# Patient Record
Sex: MALE | Race: WHITE | Employment: FULL TIME | URBAN - METROPOLITAN AREA
[De-identification: names, ages, dates, MRNs, and addresses within clinical notes are randomized per-mention and may not be internally consistent; named-entity substitution may affect disease eponyms.]

---

## 2019-03-26 ENCOUNTER — APPOINTMENT (OUTPATIENT)
Dept: GENERAL RADIOLOGY | Age: 29
End: 2019-03-26
Attending: NURSE PRACTITIONER
Payer: SUBSIDIZED

## 2019-03-26 ENCOUNTER — HOSPITAL ENCOUNTER (EMERGENCY)
Age: 29
Discharge: HOME | End: 2019-03-26
Attending: EMERGENCY MEDICINE
Payer: SUBSIDIZED

## 2019-03-26 ENCOUNTER — APPOINTMENT (OUTPATIENT)
Dept: CT IMAGING | Age: 29
End: 2019-03-26
Attending: NURSE PRACTITIONER
Payer: SUBSIDIZED

## 2019-03-26 VITALS
HEART RATE: 76 BPM | DIASTOLIC BLOOD PRESSURE: 99 MMHG | OXYGEN SATURATION: 97 % | TEMPERATURE: 97.5 F | SYSTOLIC BLOOD PRESSURE: 148 MMHG | RESPIRATION RATE: 15 BRPM

## 2019-03-26 DIAGNOSIS — G47.33 OSA (OBSTRUCTIVE SLEEP APNEA): ICD-10-CM

## 2019-03-26 DIAGNOSIS — I10 ESSENTIAL HYPERTENSION: Primary | ICD-10-CM

## 2019-03-26 LAB
ALBUMIN SERPL-MCNC: 4.3 G/DL (ref 3.5–5)
ALBUMIN/GLOB SERPL: 1.3 {RATIO} (ref 1.1–2.2)
ALP SERPL-CCNC: 92 U/L (ref 45–117)
ALT SERPL-CCNC: 54 U/L (ref 12–78)
ANION GAP SERPL CALC-SCNC: 5 MMOL/L (ref 5–15)
APPEARANCE UR: CLEAR
AST SERPL-CCNC: 24 U/L (ref 15–37)
ATRIAL RATE: 80 BPM
BACTERIA URNS QL MICRO: NEGATIVE /HPF
BASOPHILS # BLD: 0.1 K/UL (ref 0–0.1)
BASOPHILS NFR BLD: 1 % (ref 0–1)
BILIRUB SERPL-MCNC: 0.4 MG/DL (ref 0.2–1)
BILIRUB UR QL: NEGATIVE
BUN SERPL-MCNC: 16 MG/DL (ref 6–20)
BUN/CREAT SERPL: 21 (ref 12–20)
CALCIUM SERPL-MCNC: 9 MG/DL (ref 8.5–10.1)
CALCULATED P AXIS, ECG09: 13 DEGREES
CALCULATED R AXIS, ECG10: -11 DEGREES
CALCULATED T AXIS, ECG11: 12 DEGREES
CHLORIDE SERPL-SCNC: 106 MMOL/L (ref 97–108)
CO2 SERPL-SCNC: 27 MMOL/L (ref 21–32)
COLOR UR: NORMAL
COMMENT, HOLDF: NORMAL
CREAT SERPL-MCNC: 0.75 MG/DL (ref 0.7–1.3)
D DIMER PPP FEU-MCNC: <0.19 MG/L FEU (ref 0–0.65)
DIAGNOSIS, 93000: NORMAL
DIFFERENTIAL METHOD BLD: NORMAL
EOSINOPHIL # BLD: 0.3 K/UL (ref 0–0.4)
EOSINOPHIL NFR BLD: 6 % (ref 0–7)
EPITH CASTS URNS QL MICRO: NORMAL /LPF
ERYTHROCYTE [DISTWIDTH] IN BLOOD BY AUTOMATED COUNT: 12.3 % (ref 11.5–14.5)
GLOBULIN SER CALC-MCNC: 3.3 G/DL (ref 2–4)
GLUCOSE SERPL-MCNC: 91 MG/DL (ref 65–100)
GLUCOSE UR STRIP.AUTO-MCNC: NEGATIVE MG/DL
HCT VFR BLD AUTO: 47.8 % (ref 36.6–50.3)
HGB BLD-MCNC: 16.5 G/DL (ref 12.1–17)
HGB UR QL STRIP: NEGATIVE
HYALINE CASTS URNS QL MICRO: NORMAL /LPF (ref 0–5)
IMM GRANULOCYTES # BLD AUTO: 0 K/UL (ref 0–0.04)
IMM GRANULOCYTES NFR BLD AUTO: 0 % (ref 0–0.5)
KETONES UR QL STRIP.AUTO: NEGATIVE MG/DL
LEUKOCYTE ESTERASE UR QL STRIP.AUTO: NEGATIVE
LYMPHOCYTES # BLD: 1.6 K/UL (ref 0.8–3.5)
LYMPHOCYTES NFR BLD: 31 % (ref 12–49)
MCH RBC QN AUTO: 30.3 PG (ref 26–34)
MCHC RBC AUTO-ENTMCNC: 34.5 G/DL (ref 30–36.5)
MCV RBC AUTO: 87.7 FL (ref 80–99)
MONOCYTES # BLD: 0.5 K/UL (ref 0–1)
MONOCYTES NFR BLD: 9 % (ref 5–13)
NEUTS SEG # BLD: 2.8 K/UL (ref 1.8–8)
NEUTS SEG NFR BLD: 53 % (ref 32–75)
NITRITE UR QL STRIP.AUTO: NEGATIVE
NRBC # BLD: 0 K/UL (ref 0–0.01)
NRBC BLD-RTO: 0 PER 100 WBC
P-R INTERVAL, ECG05: 164 MS
PH UR STRIP: 7 [PH] (ref 5–8)
PLATELET # BLD AUTO: 291 K/UL (ref 150–400)
PMV BLD AUTO: 9.5 FL (ref 8.9–12.9)
POTASSIUM SERPL-SCNC: 3.7 MMOL/L (ref 3.5–5.1)
PROT SERPL-MCNC: 7.6 G/DL (ref 6.4–8.2)
PROT UR STRIP-MCNC: NEGATIVE MG/DL
Q-T INTERVAL, ECG07: 358 MS
QRS DURATION, ECG06: 96 MS
QTC CALCULATION (BEZET), ECG08: 412 MS
RBC # BLD AUTO: 5.45 M/UL (ref 4.1–5.7)
RBC #/AREA URNS HPF: NORMAL /HPF (ref 0–5)
SAMPLES BEING HELD,HOLD: NORMAL
SODIUM SERPL-SCNC: 138 MMOL/L (ref 136–145)
SP GR UR REFRACTOMETRY: 1.02 (ref 1–1.03)
TROPONIN I SERPL-MCNC: <0.05 NG/ML
UROBILINOGEN UR QL STRIP.AUTO: 0.2 EU/DL (ref 0.2–1)
VENTRICULAR RATE, ECG03: 80 BPM
WBC # BLD AUTO: 5.3 K/UL (ref 4.1–11.1)
WBC URNS QL MICRO: NORMAL /HPF (ref 0–4)

## 2019-03-26 PROCEDURE — 36415 COLL VENOUS BLD VENIPUNCTURE: CPT

## 2019-03-26 PROCEDURE — 71046 X-RAY EXAM CHEST 2 VIEWS: CPT

## 2019-03-26 PROCEDURE — 93005 ELECTROCARDIOGRAM TRACING: CPT

## 2019-03-26 PROCEDURE — 84484 ASSAY OF TROPONIN QUANT: CPT

## 2019-03-26 PROCEDURE — 85025 COMPLETE CBC W/AUTO DIFF WBC: CPT

## 2019-03-26 PROCEDURE — 99283 EMERGENCY DEPT VISIT LOW MDM: CPT

## 2019-03-26 PROCEDURE — 70450 CT HEAD/BRAIN W/O DYE: CPT

## 2019-03-26 PROCEDURE — 81001 URINALYSIS AUTO W/SCOPE: CPT

## 2019-03-26 PROCEDURE — 80053 COMPREHEN METABOLIC PANEL: CPT

## 2019-03-26 PROCEDURE — 85379 FIBRIN DEGRADATION QUANT: CPT

## 2019-03-26 RX ORDER — AMLODIPINE BESYLATE 5 MG/1
5 TABLET ORAL DAILY
Qty: 30 TAB | Refills: 0 | Status: SHIPPED | OUTPATIENT
Start: 2019-03-26

## 2019-03-26 NOTE — LETTER
NOTIFICATION RETURN TO WORK / SCHOOL 
 
3/26/2019 12:35 PM 
 
Mr. Lauren To [address] [address] To Whom It May Concern: 
 
Lauren To is currently under the care of [facility]. He will return to work/school on: March 27, 2019 If there are questions or concerns please have the patient contact our office.  
 
 
 
Sincerely, 
 
 
 
 
Luke Aguilar NP 
12:36 PM

## 2019-03-26 NOTE — ED NOTES
Patient's oxygen saturation while sitting are 99%. While walking around the nurses stations the patient's saturations dropped to 96%. Patient reports \"a little bit of SOB\"

## 2019-03-26 NOTE — ED PROVIDER NOTES
The pt is a 33year old male who presents ambulatory from work to the ED with complaints SOB that began around 0200 this am. Associated symptoms include lightheadedness jaw tightness, and chest tightness that has been progressive for the last two weeks. He reports stepping on a nail 6 weeks ago and does not know when last tetanus booster was. Chest tightness is generalized and non exertional. No ETOH or drug use. 1/2 PPD smoker. Pt denies fevers, chills, night sweats, PALMER, PND, orthopnea, abdominal pain, n/v/d, melena, hematuria, dysuria, constipation, HA, and syncope. + CHANDRIKA but not does wear. Foot wound is well healed without pain, redness, or swelling. Past Medical History: 
No date: Brain aneurysm No date: Polycystic kidney disease Past Surgical History: 
No date: HX TONSILLECTOMY No date: NEUROLOGICAL PROCEDURE UNLISTED Comment:  brain coiling x 2 Past Medical History:  
Diagnosis Date  Brain aneurysm  Polycystic kidney disease Past Surgical History:  
Procedure Laterality Date  HX TONSILLECTOMY  NEUROLOGICAL PROCEDURE UNLISTED    
 brain coiling x 2 No family history on file. Social History Socioeconomic History  Marital status: SINGLE Spouse name: Not on file  Number of children: Not on file  Years of education: Not on file  Highest education level: Not on file Occupational History  Not on file Social Needs  Financial resource strain: Not on file  Food insecurity:  
  Worry: Not on file Inability: Not on file  Transportation needs:  
  Medical: Not on file Non-medical: Not on file Tobacco Use  Smoking status: Current Every Day Smoker Packs/day: 0.50 Substance and Sexual Activity  Alcohol use: No  
  Comment: Occ  Drug use: Yes Types: Marijuana Comment: occasionally  Sexual activity: Not on file Lifestyle  Physical activity:  
  Days per week: Not on file Minutes per session: Not on file  Stress: Not on file Relationships  Social connections:  
  Talks on phone: Not on file Gets together: Not on file Attends Gnosticism service: Not on file Active member of club or organization: Not on file Attends meetings of clubs or organizations: Not on file Relationship status: Not on file  Intimate partner violence:  
  Fear of current or ex partner: Not on file Emotionally abused: Not on file Physically abused: Not on file Forced sexual activity: Not on file Other Topics Concern  Not on file Social History Narrative  Not on file ALLERGIES: Patient has no known allergies. Review of Systems Constitutional: Negative for activity change, appetite change, chills, diaphoresis, fatigue, fever and unexpected weight change. HENT: Negative for congestion, ear pain, rhinorrhea, sinus pressure, sore throat, tinnitus, trouble swallowing and voice change. Eyes: Negative for pain, discharge, redness and visual disturbance. Respiratory: Positive for chest tightness and shortness of breath. Negative for apnea, cough, choking, wheezing and stridor. Cardiovascular: Positive for chest pain. Negative for palpitations and leg swelling. Gastrointestinal: Negative for abdominal pain, constipation, nausea and vomiting. Endocrine: Negative for cold intolerance and heat intolerance. Genitourinary: Negative for difficulty urinating, dysuria, flank pain, hematuria, testicular pain and urgency. Musculoskeletal: Negative for arthralgias, back pain, gait problem, joint swelling, myalgias, neck pain and neck stiffness. Skin: Negative for color change, pallor, rash and wound. Allergic/Immunologic: Negative for immunocompromised state. Neurological: Negative for dizziness, tremors, syncope, weakness, light-headedness, numbness and headaches. Hematological: Does not bruise/bleed easily. Psychiatric/Behavioral: Negative for agitation, confusion and suicidal ideas. Vitals:  
 03/26/19 6355 Pulse: 88 SpO2: 97% Physical Exam  
Constitutional: He is oriented to person, place, and time. He appears well-developed and well-nourished. No distress. HENT:  
Head: Atraumatic. Nose: Nose normal.  
Mouth/Throat: No oropharyngeal exudate. Eyes: Conjunctivae and EOM are normal. Right eye exhibits no discharge. Left eye exhibits no discharge. No scleral icterus. Neck: Normal range of motion. Neck supple. No JVD present. No tracheal deviation present. No thyromegaly present. Cardiovascular: Normal rate and regular rhythm. Exam reveals no gallop and no friction rub. No murmur heard. Pulmonary/Chest: Breath sounds normal. No stridor. No respiratory distress. He has no wheezes. He has no rales. He exhibits no tenderness. Abdominal: Soft. Bowel sounds are normal. He exhibits no distension and no mass. There is no tenderness. There is no rebound and no guarding. Musculoskeletal: Normal range of motion. He exhibits no edema or tenderness. Lymphadenopathy:  
  He has no cervical adenopathy. Neurological: He is alert and oriented to person, place, and time. Coordination normal.  
Skin: Skin is warm and dry. He is not diaphoretic. Psychiatric: He has a normal mood and affect. His behavior is normal.  
Nursing note and vitals reviewed. MDM Number of Diagnoses or Management Options Diagnosis management comments:  
 * routine laboratory data and UA * CXR * EKG Amount and/or Complexity of Data Reviewed Clinical lab tests: ordered and reviewed Tests in the radiology section of CPT®: ordered and reviewed Discussion of test results with the performing providers: yes Review and summarize past medical records: yes Discuss the patient with other providers: yes Risk of Complications, Morbidity, and/or Mortality General comments:  
 - stable, ambulatory pt in NAD 
 Patient Progress Patient progress: stable Procedures 12:41 PM 
Pt has been reevaluated. There are no new complaints, changes, or physical findings at this time. Medications have been reviewed w/ pt and/or family. Pt and/or family's questions have been answered. Pt and/or family expressed good understanding of the dx/tx/rx and is in agreement with plan of care. Pt instructed and agreed to f/u w/ PCP and to return to ED upon further deterioration. Pt is ready for discharge. LABORATORY TESTS: 
Recent Results (from the past 12 hour(s)) EKG, 12 LEAD, INITIAL Collection Time: 03/26/19 10:04 AM  
Result Value Ref Range Ventricular Rate 80 BPM  
 Atrial Rate 80 BPM  
 P-R Interval 164 ms QRS Duration 96 ms  
 Q-T Interval 358 ms QTC Calculation (Bezet) 412 ms Calculated P Axis 13 degrees Calculated R Axis -11 degrees Calculated T Axis 12 degrees Diagnosis Normal sinus rhythm Nonspecific T wave abnormality When compared with ECG of 14-OCT-2012 03:19, 
Previous ECG has undetermined rhythm, needs review Confirmed by Aggie Rodriguez M.D., Kristen Sinclair ([id number]) on 3/26/2019 66:20:21 AM 
  
METABOLIC PANEL, COMPREHENSIVE Collection Time: 03/26/19 10:09 AM  
Result Value Ref Range Sodium 138 136 - 145 mmol/L Potassium 3.7 3.5 - 5.1 mmol/L Chloride 106 97 - 108 mmol/L  
 CO2 27 21 - 32 mmol/L Anion gap 5 5 - 15 mmol/L Glucose 91 65 - 100 mg/dL BUN 16 6 - 20 MG/DL Creatinine 0.75 0.70 - 1.30 MG/DL  
 BUN/Creatinine ratio 21 (H) 12 - 20 GFR est AA >60 >60 ml/min/1.73m2 GFR est non-AA >60 >60 ml/min/1.73m2 Calcium 9.0 8.5 - 10.1 MG/DL Bilirubin, total 0.4 0.2 - 1.0 MG/DL  
 ALT (SGPT) 54 12 - 78 U/L  
 AST (SGOT) 24 15 - 37 U/L Alk. phosphatase 92 45 - 117 U/L Protein, total 7.6 6.4 - 8.2 g/dL Albumin 4.3 3.5 - 5.0 g/dL Globulin 3.3 2.0 - 4.0 g/dL A-G Ratio 1.3 1.1 - 2.2    
CBC WITH AUTOMATED DIFF  Collection Time: 03/26/19 10:09 AM  
 Result Value Ref Range WBC 5.3 4.1 - 11.1 K/uL  
 RBC 5.45 4.10 - 5.70 M/uL  
 HGB 16.5 12.1 - 17.0 g/dL HCT 47.8 36.6 - 50.3 % MCV 87.7 80.0 - 99.0 FL  
 MCH 30.3 26.0 - 34.0 PG  
 MCHC 34.5 30.0 - 36.5 g/dL  
 RDW 12.3 11.5 - 14.5 % PLATELET 916 463 - 237 K/uL MPV 9.5 8.9 - 12.9 FL  
 NRBC 0.0 0  WBC ABSOLUTE NRBC 0.00 0.00 - 0.01 K/uL NEUTROPHILS 53 32 - 75 % LYMPHOCYTES 31 12 - 49 % MONOCYTES 9 5 - 13 % EOSINOPHILS 6 0 - 7 % BASOPHILS 1 0 - 1 % IMMATURE GRANULOCYTES 0 0.0 - 0.5 % ABS. NEUTROPHILS 2.8 1.8 - 8.0 K/UL  
 ABS. LYMPHOCYTES 1.6 0.8 - 3.5 K/UL  
 ABS. MONOCYTES 0.5 0.0 - 1.0 K/UL  
 ABS. EOSINOPHILS 0.3 0.0 - 0.4 K/UL  
 ABS. BASOPHILS 0.1 0.0 - 0.1 K/UL  
 ABS. IMM. GRANS. 0.0 0.00 - 0.04 K/UL  
 DF AUTOMATED    
TROPONIN I Collection Time: 03/26/19 10:09 AM  
Result Value Ref Range Troponin-I, Qt. <0.05 <0.05 ng/mL SAMPLES BEING HELD Collection Time: 03/26/19 10:09 AM  
Result Value Ref Range SAMPLES BEING HELD 1RED,1BLUE   
 COMMENT Add-on orders for these samples will be processed based on acceptable specimen integrity and analyte stability, which may vary by analyte. URINALYSIS W/MICROSCOPIC Collection Time: 03/26/19 10:33 AM  
Result Value Ref Range Color YELLOW/STRAW Appearance CLEAR CLEAR Specific gravity 1.016 1.003 - 1.030    
 pH (UA) 7.0 5.0 - 8.0 Protein NEGATIVE  NEG mg/dL Glucose NEGATIVE  NEG mg/dL Ketone NEGATIVE  NEG mg/dL Bilirubin NEGATIVE  NEG Blood NEGATIVE  NEG Urobilinogen 0.2 0.2 - 1.0 EU/dL Nitrites NEGATIVE  NEG Leukocyte Esterase NEGATIVE  NEG    
 WBC 0-4 0 - 4 /hpf  
 RBC 0-5 0 - 5 /hpf Epithelial cells FEW FEW /lpf Bacteria NEGATIVE  NEG /hpf Hyaline cast 0-2 0 - 5 /lpf D DIMER Collection Time: 03/26/19 11:09 AM  
Result Value Ref Range D-dimer <0.19 0.00 - 0.65 mg/L FEU IMAGING RESULTS: 
CT HEAD WO CONT Final Result IMPRESSION: Status post right MCA aneurysm coiling. No acute process or change  
compared to the prior exam.  
  
  
  
XR CHEST PA LAT Final Result Impression: No acute process or change compared to the prior exam.  
   
  
 
Xr Chest Pa Lat Result Date: 3/26/2019 Exam:  2 view chest Indication: Shortness of breath Comparison to 10/14/2012. PA and lateral views demonstrate normal heart size. There is no acute process in the lung fields. The osseous structures are unremarkable. Impression: No acute process or change compared to the prior exam.   
 
Ct Head Wo Cont Result Date: 3/26/2019 EXAM: CT HEAD WO CONT INDICATION: Hypertension with history of aneurysm COMPARISON: 4/19/2013. CONTRAST: None. TECHNIQUE: Unenhanced CT of the head was performed using 5 mm images. Brain and bone windows were generated. CT dose reduction was achieved through use of a standardized protocol tailored for this examination and automatic exposure control for dose modulation. FINDINGS: The ventricles and sulci are normal in size, shape and configuration and midline. The patient is status post right MCA aneurysm coiling. There is no significant white matter disease. There is no intracranial hemorrhage, extra-axial collection, mass, mass effect or midline shift. The basilar cisterns are open. No acute infarct is identified. The bone windows demonstrate no abnormalities. The visualized portions of the paranasal sinuses and mastoid air cells are clear. IMPRESSION: Status post right MCA aneurysm coiling. No acute process or change compared to the prior exam.  
 
 
 
MEDICATIONS GIVEN: 
Medications - No data to display IMPRESSION: 
1. Essential hypertension 2. CHANDRIKA (obstructive sleep apnea) PLAN: 
1. Current Discharge Medication List  
  
START taking these medications Details  
amLODIPine (NORVASC) 5 mg tablet Take 1 Tab by mouth daily. Qty: 30 Tab, Refills: 0 Associated Diagnoses: Essential hypertension 2. Follow-up Information Follow up With Specialties Details Why Contact Info [address] In 2 days  [address] [id number] [phone] [facility] Emergency Medicine  As needed, If symptoms worsen [address] 
[phone]  
  
 
3. Supportive care - pt refused tetanus booster Return to ED if worse Wilfredo Heard NP 
12:41 PM

## 2019-03-26 NOTE — ED TRIAGE NOTES
Patient arrives for SOB that woke him up out of his sleep. + chest pain, dizziness, left and right sided jaw pain Denies nausea, chills, fevers

## 2019-03-26 NOTE — ED TRIAGE NOTES
Pt states that he woke last night around 10 pm unable to catch his breath, went back to bed around 2 am and then woke at 5 am SOB at rest. Pt states that he has chest tightness and some dizziness. Pt states that he stepped on a nail about a month ago, not sure if his tetanus is up to date and is wondering if he is having a tetanus reaction.

## 2019-03-26 NOTE — DISCHARGE INSTRUCTIONS
Patient Education        Learning About CPAP for Sleep Apnea  What is CPAP? CPAP is a small machine that you use at home every night while you sleep. It increases air pressure in your throat to keep your airway open. When you have sleep apnea, this can help you sleep better so you feel much better. CPAP stands for \"continuous positive airway pressure. \"  The CPAP machine will have one of the following:  · A mask that covers your nose and mouth  · Prongs that fit into your nose  · A mask that covers your nose only, the most common type. This type is called NCPAP. The N stands for \"nasal.\"  Why is it done? CPAP is usually the best treatment for obstructive sleep apnea. It is the first treatment choice and the most widely used. Your doctor may suggest CPAP if you have:  · Moderate to severe sleep apnea. · Sleep apnea and coronary artery disease (CAD). · Sleep apnea and heart failure. How does it help? · CPAP can help you have more normal sleep, so you feel less sleepy and more alert during the daytime. · CPAP may help keep heart failure or other heart problems from getting worse. · CPAP may help lower your blood pressure. · If you use CPAP, your bed partner may also sleep better because you are not snoring or restless. What are the side effects? Some people who use CPAP have:  · A dry or stuffy nose and a sore throat. · Irritated skin on the face. · Sore eyes. · Bloating. If you have any of these problems, work with your doctor to fix them. Here are some things you can try:  · Be sure the mask or nasal prongs fit well. · See if your doctor can adjust the pressure of your CPAP. · If your nose is dry, try a humidifier. · If your nose is runny or stuffy, try decongestant medicine or a steroid nasal spray. Be safe with medicines. Read and follow all instructions on the label. Do not use the medicine longer than the label says. If these things do not help, you might try a different type of machine.  Some machines have air pressure that adjusts on its own. Others have air pressures that are different when you breathe in than when you breathe out. This may reduce discomfort caused by too much pressure in your nose. Where can you learn more? Go to http://abby-brittni.info/. Enter Y879 in the search box to learn more about \"Learning About CPAP for Sleep Apnea. \"  Current as of: September 5, 2018  Content Version: 11.9  © 8529-3759 [employer]. Care instructions adapted under license by [employer] (which disclaims liability or warranty for this information). If you have questions about a medical condition or this instruction, always ask your healthcare professional. [address] any warranty or liability for your use of this information. We hope that we have addressed all of your medical concerns. The examination and treatment you received in the Emergency Department were for an emergent problem and were not intended as complete care. It is important that you follow up with your healthcare provider(s) for ongoing care. If your symptoms worsen or do not improve as expected, and you are unable to reach your usual health care provider(s), you should return to the Emergency Department. Today's healthcare is undergoing tremendous change, and patient satisfaction surveys are one of the many tools to assess the quality of medical care. You may receive a survey from the [employer] regarding your experience in the Emergency Department. I hope that your experience has been completely positive, particularly the medical care that I provided. As such, please participate in the survey; anything less than excellent does not meet my expectations or intentions. [address] [facility] and [facility] participate in nationally recognized quality of care measures.   If your blood pressure is greater than 120/80, as reported below, we urge that you seek medical care to address the potential of high blood pressure, commonly known as hypertension. Hypertension can be hereditary or can be caused by certain medical conditions, pain, stress, or \"white coat syndrome. \"       Please make an appointment with your health care provider(s) for follow up of your Emergency Department visit. VITALS:   Patient Vitals for the past 8 hrs:   Temp Pulse Resp BP SpO2   03/26/19 0944 97.9 °F (36.6 °C) 77 15 (!) 172/109 94 %   03/26/19 0907 -- 88 -- -- 97 %          Thank you for allowing us to provide you with medical care today. We realize that you have many choices for your emergency care needs. Please choose us in the future for any continued health care needs. Missy Merchantruperto  Shawna Finders, [address] [place] [address] 20.   Office: [phone]            Recent Results (from the past 24 hour(s))   EKG, 12 LEAD, INITIAL    Collection Time: 03/26/19 10:04 AM   Result Value Ref Range    Ventricular Rate 80 BPM    Atrial Rate 80 BPM    P-R Interval 164 ms    QRS Duration 96 ms    Q-T Interval 358 ms    QTC Calculation (Bezet) 412 ms    Calculated P Axis 13 degrees    Calculated R Axis -11 degrees    Calculated T Axis 12 degrees    Diagnosis       Normal sinus rhythm  Nonspecific T wave abnormality  When compared with ECG of 14-OCT-2012 03:19,  Previous ECG has undetermined rhythm, needs review  Confirmed by Katya Kingsley M.D., Mis Mcguire ([id number]) on 3/26/2019 69:29:62 AM     METABOLIC PANEL, COMPREHENSIVE    Collection Time: 03/26/19 10:09 AM   Result Value Ref Range    Sodium 138 136 - 145 mmol/L    Potassium 3.7 3.5 - 5.1 mmol/L    Chloride 106 97 - 108 mmol/L    CO2 27 21 - 32 mmol/L    Anion gap 5 5 - 15 mmol/L    Glucose 91 65 - 100 mg/dL    BUN 16 6 - 20 MG/DL    Creatinine 0.75 0.70 - 1.30 MG/DL    BUN/Creatinine ratio 21 (H) 12 - 20      GFR est AA >60 >60 ml/min/1.73m2    GFR est non-AA >60 >60 ml/min/1.73m2    Calcium 9.0 8.5 - 10.1 MG/DL    Bilirubin, total 0.4 0.2 - 1.0 MG/DL    ALT (SGPT) 54 12 - 78 U/L    AST (SGOT) 24 15 - 37 U/L    Alk. phosphatase 92 45 - 117 U/L    Protein, total 7.6 6.4 - 8.2 g/dL    Albumin 4.3 3.5 - 5.0 g/dL    Globulin 3.3 2.0 - 4.0 g/dL    A-G Ratio 1.3 1.1 - 2.2     CBC WITH AUTOMATED DIFF    Collection Time: 03/26/19 10:09 AM   Result Value Ref Range    WBC 5.3 4.1 - 11.1 K/uL    RBC 5.45 4.10 - 5.70 M/uL    HGB 16.5 12.1 - 17.0 g/dL    HCT 47.8 36.6 - 50.3 %    MCV 87.7 80.0 - 99.0 FL    MCH 30.3 26.0 - 34.0 PG    MCHC 34.5 30.0 - 36.5 g/dL    RDW 12.3 11.5 - 14.5 %    PLATELET 075 595 - 968 K/uL    MPV 9.5 8.9 - 12.9 FL    NRBC 0.0 0  WBC    ABSOLUTE NRBC 0.00 0.00 - 0.01 K/uL    NEUTROPHILS 53 32 - 75 %    LYMPHOCYTES 31 12 - 49 %    MONOCYTES 9 5 - 13 %    EOSINOPHILS 6 0 - 7 %    BASOPHILS 1 0 - 1 %    IMMATURE GRANULOCYTES 0 0.0 - 0.5 %    ABS. NEUTROPHILS 2.8 1.8 - 8.0 K/UL    ABS. LYMPHOCYTES 1.6 0.8 - 3.5 K/UL    ABS. MONOCYTES 0.5 0.0 - 1.0 K/UL    ABS. EOSINOPHILS 0.3 0.0 - 0.4 K/UL    ABS. BASOPHILS 0.1 0.0 - 0.1 K/UL    ABS. IMM. GRANS. 0.0 0.00 - 0.04 K/UL    DF AUTOMATED     TROPONIN I    Collection Time: 03/26/19 10:09 AM   Result Value Ref Range    Troponin-I, Qt. <0.05 <0.05 ng/mL   SAMPLES BEING HELD    Collection Time: 03/26/19 10:09 AM   Result Value Ref Range    SAMPLES BEING HELD 1RED,1BLUE     COMMENT        Add-on orders for these samples will be processed based on acceptable specimen integrity and analyte stability, which may vary by analyte.    URINALYSIS W/MICROSCOPIC    Collection Time: 03/26/19 10:33 AM   Result Value Ref Range    Color YELLOW/STRAW      Appearance CLEAR CLEAR      Specific gravity 1.016 1.003 - 1.030      pH (UA) 7.0 5.0 - 8.0      Protein NEGATIVE  NEG mg/dL    Glucose NEGATIVE  NEG mg/dL    Ketone NEGATIVE  NEG mg/dL    Bilirubin NEGATIVE  NEG      Blood NEGATIVE  NEG      Urobilinogen 0.2 0.2 - 1.0 EU/dL    Nitrites NEGATIVE  NEG      Leukocyte Esterase NEGATIVE  NEG      WBC 0-4 0 - 4 /hpf    RBC 0-5 0 - 5 /hpf    Epithelial cells FEW FEW /lpf    Bacteria NEGATIVE  NEG /hpf    Hyaline cast 0-2 0 - 5 /lpf   D DIMER    Collection Time: 03/26/19 11:09 AM   Result Value Ref Range    D-dimer <0.19 0.00 - 0.65 mg/L FEU       Xr Chest Pa Lat    Result Date: 3/26/2019  Exam:  2 view chest Indication: Shortness of breath Comparison to 10/14/2012. PA and lateral views demonstrate normal heart size. There is no acute process in the lung fields. The osseous structures are unremarkable. Impression: No acute process or change compared to the prior exam.      Ct Head Wo Cont    Result Date: 3/26/2019  EXAM: CT HEAD WO CONT INDICATION: Hypertension with history of aneurysm COMPARISON: 4/19/2013. CONTRAST: None. TECHNIQUE: Unenhanced CT of the head was performed using 5 mm images. Brain and bone windows were generated. CT dose reduction was achieved through use of a standardized protocol tailored for this examination and automatic exposure control for dose modulation. FINDINGS: The ventricles and sulci are normal in size, shape and configuration and midline. The patient is status post right MCA aneurysm coiling. There is no significant white matter disease. There is no intracranial hemorrhage, extra-axial collection, mass, mass effect or midline shift. The basilar cisterns are open. No acute infarct is identified. The bone windows demonstrate no abnormalities. The visualized portions of the paranasal sinuses and mastoid air cells are clear. IMPRESSION: Status post right MCA aneurysm coiling.  No acute process or change compared to the prior exam.

## 2019-03-26 NOTE — ED NOTES
1240: Patient verbalizes understanding of discharge instructions given by provider, Lala Guevara NP. Opportunity for questions provided. Patient in no apparent distress. Patient ambulatory upon discharge. Visit Vitals BP (!) 148/99 (BP 1 Location: Right arm, BP Patient Position: At rest;Sitting) Pulse 76 Temp 97.5 °F (36.4 °C) Resp 15 SpO2 97%